# Patient Record
(demographics unavailable — no encounter records)

---

## 2025-02-27 NOTE — SOCIAL HISTORY
Indication for Procedure: Patient is here for radiofrequency ablation of  the right  Suprapatellar, superior lateral and medial and inferior medial genicular nerves of knee because of chronic knee pain unresponsive to previous conservative therapy with medications and physical therapy.     Preoperative Diagnosis: bilateral knee osteoarthritis and chronic bilateral knee pain     Postoperative Diagnosis: Same      Procedure: radiofrequency ablation of right knee- suprapatellar nerve of genicular nerve, superolateral nerve of genicular nerve, superomedial nerve of genicular nerve and inferomedial nerve of genicular nerve injection under fluoroscopic guidance CPT 46343, 89927-78, 61256-08, 6460-59     Anesthesia: intravenous moderate conscious sedation  CPT 82545    Sedation time: 15 minutes     Complications: None     Blood Loss: None     Procedure Note:     After informed consent was obtained from the patient, the patient was then placed in the supine position.      Correct site of injection was marked before the procedure and time out was performed to ensure the correct site and type of injection and correct patient with 2 identifiers.    Moderate sedation was administered with continuous monitoring of the patient by a trained caregiver under my direct supervision. Please refer to nursing documentation for doses of medications administered intravenously as well as the patient’s status during the procedure.      Sterile prep with chloroprep solution, 1% lidocaine was used for skin infiltration.    Four 17-G radiofrequency needles # 40 mm needles were positioned to target   the suprapatellar nerve, superolateral nerve and superomedial nerve and inferomedial nerves of the  genicular nerves around the right   knee joint under fluoroscopic guidance.     The stylets were removed and the radiofrequency probes with a 4mm active tip were then inserted. Needle tip position of the probes were verified in the AP, oblique, and  lateral views. At each site, the medial branch nerve was stimulated at 2Hz to a maximum of 1-2volts determined to finalize safe needle and electrode placement. The patient was awake and responsive during this portion of the procedure. Each target was anesthetized with 1-2mL of 0.25% bupivacaine and 10 mg triamcinolone anesthesia for lesioning and then each target was lesioned at 80 degrees Celsius for 2 minutes and 30 seconds. Tissue impedences were noted to be between 250 and 500 Ohms.    The needles were removed at the end of the procedure and sterile dressing placed.      Patient tolerated the procedure well without any adverse reactions, discharge instructions given to the patient and will be followed up for repeat injection if necessary.     Discharge instructions were given which included application of ice pack over injection site and to continue taking any anti-inflammatory or pain medications. Activity limitations include avoiding strenuous bending, twisting of hips.     Landry Mi MD  Interventional Pain Management  Board certified and fellowship trained               [Parent(s)] : parent(s) [None] : none [Smokers in Household] : there are no smokers in the home

## 2025-02-27 NOTE — HISTORY OF PRESENT ILLNESS
[FreeTextEntry1] : 4yo with Achondroplasia  NPS-[facility] (5/23/21):  oAHI: 1.5/hr, SHIELA 2.1/hr  lowest sat: 89%  average saturation: 99%  highest TCO2: 45 mm Hg  [facility] (3/5/22):  oAHI: 5.8/hr REM 14.4/hr  lowest sat: 79%  average saturation: 93.4%, TCO2:  58 mm Hg  [facility]  oAHI:  13.2 REM 66.1   lowest sat: 82%  average saturation: 96%  highest TCO2:  63 mm Hg  c/w  Adenoidectomy 3/23 T&Gil 3/24 NPS-[place] (6/21/24): oAHI: 43.6 lowest sat:73 % average saturation: 97% highest ETCO2: 55 mm Hg (18% > 50) ,   on O2 pending CPAP  Mask Fitting 10/20/24 Resp Wisp SCM nasal mask (may need larger headgear) Titration-[facility] (10/27/24):   Optimal pressure: 9.  oAHI 0, ave sat 98%, lowest 95%, [id number] 50, mild snoring throughout  DME: [employer] Machine: Vent, stopped alarms last week because was going off nonstop for apnea  Settings: CPAP 9 (planned to trial lower settings as "ramp" but now tolerating) Interface: Wisp, one size larger than the giraffe - fits better but occasional mouth leak  Adherence: now tolerating Barriers to use: prior-alarms, pressure Cleaning/care: yes Supplies' needed: no Daytime symptoms when uses: Nighgttime symptoms when uses: better, sats >95% (off oxygen)   9/24 Pulm: No chronic respiratory symptoms including no chest pain, cough, SOB, exercise intolerance, wheeze.  Normal recovery with colds.  No neb/inhaler use.  No pneumonia,.  No cough/choking with feeds.  No resp c/o anesthesia.  Sleep: Mult sleep studies, jie in [facility] (5/23/21):  oAHI: 1.5/hr, SHIELA 2.1/hr  lowest sat: 89%  average saturation: 99%  highest TCO2: 45 mm Hg   [facility] (3/5/22):  oAHI: 5.8/hr REM 14.4/hr  lowest sat: 79%  average saturation: 93.4%, TCO2:  58 mm Hg  c/w [facility] (9/14/22):  oAHI:  13.2 REM 66.1   lowest sat: 82%  average saturation: 96%  highest TCO2:  63 mm Hg  c/w HW  Adenoidectomy 3/23 f/u study a, worsening THI, placed on oygen 1L NC at night, tolerates well.   3/24- [facility], tonsillectoy 6/24-f/u [facility] with improved but persistent THI. CPAP recommended, snoring improved. Remains on 1L O2, desats 1-2x/week, briefl, self limited, often gone before they can see the savanah   Well rested in daytime.  no naps.  2021 This is a 8 month old female with Achondroplasia.    Following at [place], primarily by Dr Varinder Davila (genetics).  Planned surgery: n/a  Respiratory complications with prior anesthesia: sedated MRI, some trouble with airway when under anesthesia, was unable to get flexion views  (only extension) but per mother enough info to think decompression not needed at this time.    Resp hx: n/a ; Colds: n/a   Baseline symptoms:  no chronic cough  Secretions:  no issues   Airway clearance: none  Diet:  MPA-no coughing   Sleep:  9:30-10pm, 9:30am, 2 naps a day, crib-nothing in it, usually feeds to sleep.  Worsening snoring, rare (maybe twice) noticeable pauses.  No cyanosis.  Prior PSG at [place] (5/23/21):  oAHI: 1.5/hr, SHIELA 2.1/hr  lowest sat: 89%  average saturation: 99%  highest TCO2: 45 mm Hg.    ENT: has not seen as outpatient, but scoped bedside in nursery   fhx: no asthma, no CF, no THI  Services: PT for torticollis

## 2025-02-27 NOTE — CONSULT LETTER
[Dear  ___] : Dear  [unfilled], [Consult Letter:] : I had the pleasure of evaluating your patient, [unfilled]. [Please see my note below.] : Please see my note below. [Consult Closing:] : Thank you very much for allowing me to participate in the care of this patient.  If you have any questions, please do not hesitate to contact me. [Sincerely,] : Sincerely, [DrRadha  ___] : Dr. INGRAM [FreeTextEntry2] : Christa Trinidad MD [FreeTextEntry3] : Mallory Riojas MD\par  Director, [facility]- Pediatric Pulmonology\par  [facility] or [place]\par  , Department of Pediatrics, [facility] [employer]\par

## 2025-02-27 NOTE — REVIEW OF SYSTEMS
[NI] : Genitourinary  [Nl] : Endocrine [Snoring] : snoring [Developmental Delay] : developmental delay [Frequent URIs] : no frequent upper respiratory infections [Daytime Sleepiness] : no daytime sleepiness [Recurrent Ear Infections] : no recurrent ear infections [Recurrent Throat Infections] : no recurrent throat infections [Cough] : no cough [Bronchiolitis] : no bronchiolitis [Pneumonia] : no pneumonia [Problems Swallowing] : no problems swallowing [Muscle Weakness] : no muscle weakness [FreeTextEntry5] : VSD/PDA [FreeTextEntry8] : ventriculomegaly  [de-identified] : MPA on [employer] then [employer]

## 2025-02-27 NOTE — BIRTH HISTORY
[At ___ Weeks Gestation] : at [unfilled] weeks gestation [ Section] : by  section [Age Appropriate] : age appropriate developmental milestones met [FreeTextEntry4] : in [place], failed car seat test

## 2025-02-27 NOTE — PHYSICAL EXAM
[Well Nourished] : well nourished [Well Developed] : well developed [Alert] : ~L alert [Active] : active [Normal Breathing Pattern] : normal breathing pattern [No Respiratory Distress] : no respiratory distress [No Drainage] : no drainage [No Conjunctivitis] : no conjunctivitis [No Nasal Drainage] : no nasal drainage [No Oral Pallor] : no oral pallor [No Oral Cyanosis] : no oral cyanosis [No Stridor] : no stridor [Absence Of Retractions] : absence of retractions [Symmetric] : symmetric [Good Expansion] : good expansion [No Acc Muscle Use] : no accessory muscle use [Good aeration to bases] : good aeration to bases [Equal Breath Sounds] : equal breath sounds bilaterally [No Crackles] : no crackles [No Rhonchi] : no rhonchi [No Wheezing] : no wheezing [Normal Sinus Rhythm] : normal sinus rhythm [No Heart Murmur] : no heart murmur [Soft, Non-Tender] : soft, non-tender [Non Distended] : was not ~L distended [Full ROM] : full range of motion [No Clubbing] : no clubbing [Capillary Refill < 2 secs] : capillary refill less than two seconds [No Cyanosis] : no cyanosis [No Petechiae] : no petechiae [No Contractures] : no contractures [Alert and  Oriented] : alert and oriented [FreeTextEntry3] : external normal  [FreeTextEntry4] : flattened nasal bridge, frontal protuberance  [de-identified] : no visible rashes on exposed skin

## 2025-02-27 NOTE — DATA REVIEWED
[FreeTextEntry1] : NPSG-[facility] (5/23/21):  oAHI: 1.5/hr, SHIELA 2.1/hr  lowest sat: 89%  average saturation: 99%  highest TCO2: 45 mm Hg   NPSG-[facility] (3/5/22):  oAHI: 5.8/hr REM 14.4/hr  lowest sat: 79%  average saturation: 93.4%, TCO2:  58 mm Hg  c/w HW   NPSG -[facility] (9/14/22):  oAHI:  13.2 REM 66.1   lowest sat: 82%  average saturation: 96%  highest TCO2:  63 mm Hg  c/w HW

## 2025-02-27 NOTE — HISTORY OF PRESENT ILLNESS
[FreeTextEntry1] : 2yo with Achondroplasia  NPS-[facility] (5/23/21):  oAHI: 1.5/hr, SHIELA 2.1/hr  lowest sat: 89%  average saturation: 99%  highest TCO2: 45 mm Hg  [facility] [place]/22):  oAHI: 5.8/hr REM 14.4/hr  lowest sat: 79%  average saturation: 93.4%, TCO2:  58 mm Hg  [facility]  oAHI:  13.2 REM 66.1   lowest sat: 82%  average saturation: 96%  highest TCO2:  63 mm Hg  c/w  Adenoidectomy 3/23 T&Gil 3/24 NPS-[place] (6/21/24): oAHI: 43.6 lowest sat:73 % average saturation: 97% highest ETCO2: 55 mm Hg (18% > 50) ,   on O2 pending CPAP  Mask Fitting 10/20/24 Resp Wisp SCM nasal mask (may need larger headgear) [facility] (10/27/24):   Optimal pressure: 9.  oAHI 0, ave sat 98%, lowest 95%, [id number] 50, mild snoring throughout  DME: [employer] Machine: Vent, stopped alarms last week because was going off nonstop for apnea  Settings: CPAP 9 (planned to trial lower settings as "ramp" but now tolerating) Interface: Wisp, one size larger than the giraffe - fits better but occasional mouth leak  Adherence: now tolerating Barriers to use: prior-alarms, pressure Cleaning/care: yes Supplies' needed: no Daytime symptoms when uses: Nighgttime symptoms when uses: better, sats >95% (off oxygen)   9/24 Pulm: No chronic respiratory symptoms including no chest pain, cough, SOB, exercise intolerance, wheeze.  Normal recovery with colds.  No neb/inhaler use.  No pneumonia,.  No cough/choking with feeds.  No resp c/o anesthesia.  Sleep: Mult sleep studies, jie in [facility] (5/23/21):  oAHI: 1.5/hr, SHIELA 2.1/hr  lowest sat: 89%  average saturation: 99%  highest TCO2: 45 mm Hg   [facility] (3/5/22):  oAHI: 5.8/hr REM 14.4/hr  lowest sat: 79%  average saturation: 93.4%, TCO2:  58 mm Hg  c/w [facility] (9/14/22):  oAHI:  13.2 REM 66.1   lowest sat: 82%  average saturation: 96%  highest TCO2:  63 mm Hg  c/w HW  Adenoidectomy 3/23 f/u study a, worsening THI, placed on oygen 1L NC at night, tolerates well.   3/24- [facility], tonsillectoy 6/24-f/u [facility] with improved but persistent THI. CPAP recommended, snoring improved. Remains on 1L O2, desats 1-2x/week, briefl, self limited, often gone before they can see the savanah   Well rested in daytime.  no naps.  2021 This is a 8 month old female with Achondroplasia.    Following at [place], primarily by Dr Varinder Davila (genetics).  Planned surgery: n/a  Respiratory complications with prior anesthesia: sedated MRI, some trouble with airway when under anesthesia, was unable to get flexion views  (only extension) but per mother enough info to think decompression not needed at this time.    Resp hx: n/a ; Colds: n/a   Baseline symptoms:  no chronic cough  Secretions:  no issues   Airway clearance: none  Diet:  MPA-no coughing   Sleep:  9:30-10pm, 9:30am, 2 naps a day, crib-nothing in it, usually feeds to sleep.  Worsening snoring, rare (maybe twice) noticeable pauses.  No cyanosis.  Prior PSG at [place] (5/23/21):  oAHI: 1.5/hr, SHIELA 2.1/hr  lowest sat: 89%  average saturation: 99%  highest TCO2: 45 mm Hg.    ENT: has not seen as outpatient, but scoped bedside in nursery   fhx: no asthma, no CF, no THI  Services: PT for torticollis

## 2025-02-27 NOTE — CONSULT LETTER
[Dear  ___] : Dear  [unfilled], [Consult Letter:] : I had the pleasure of evaluating your patient, [unfilled]. [Please see my note below.] : Please see my note below. [Consult Closing:] : Thank you very much for allowing me to participate in the care of this patient.  If you have any questions, please do not hesitate to contact me. [Sincerely,] : Sincerely, [DrRadha  ___] : Dr. INGRAM [FreeTextEntry2] : Christa Trinidad MD [FreeTextEntry3] : Mallory Riojas MD\par  Director, Pediatric Sleep Disorders Center- Pediatric Pulmonology\par  [facility] or [place]\par  , Department of Pediatrics, [facility] [employer]\par

## 2025-02-27 NOTE — REVIEW OF SYSTEMS
[NI] : Genitourinary  [Nl] : Endocrine [Snoring] : snoring [Developmental Delay] : developmental delay [Frequent URIs] : no frequent upper respiratory infections [Daytime Sleepiness] : no daytime sleepiness [Recurrent Ear Infections] : no recurrent ear infections [Recurrent Throat Infections] : no recurrent throat infections [Cough] : no cough [Bronchiolitis] : no bronchiolitis [Pneumonia] : no pneumonia [Problems Swallowing] : no problems swallowing [Muscle Weakness] : no muscle weakness [FreeTextEntry5] : VSD/PDA [FreeTextEntry8] : ventriculomegaly  [de-identified] : MPA on [employer] then [employer]

## 2025-03-05 NOTE — CONSULT LETTER
[Today's Date] : [unfilled] [Name] : Name: [unfilled] [] : : ~~ [Today's Date:] : [unfilled] [Dear  ___:] : Dear Dr. [unfilled]: [Consult] : I had the pleasure of evaluating your patient, [unfilled]. My full evaluation follows. [Consult - Single Provider] : Thank you very much for allowing me to participate in the care of this patient. If you have any questions, please do not hesitate to contact me. [Sincerely,] : Sincerely, [FreeTextEntry4] : Christa Trinidad MD [FreeTextEntry5] : [address] [facility] [address], [place] [address] [FreeTextEntry6] : [phone]

## 2025-03-05 NOTE — CARDIOLOGY SUMMARY
[Today's Date] : [unfilled] [FreeTextEntry1] : Normal sinus rhythm with a rate of 125, normal QRS axis, normal intervals, QTc 424.  No evidence of atrial or ventricular enlargement.  Normal T waves and ST segments.  No delta waves.

## 2025-03-05 NOTE — HISTORY OF PRESENT ILLNESS
[FreeTextEntry1] : I had the pleasure of seeing JAMISON ZAVALA in the pediatric cardiology clinic at [facility] on 2023; she was last seen in clinic on 2022.  JAMISON is a 2-year-old girl with achondroplasia here for follow-up of a trivial muscular VSD.  She previously was followed for a PDA, which was closed at the last visit.  Her mother had a fetal echocardiogram that was concerning for a small transverse aortic arch and isthmus -  echocardiogram showed a normal arch, small-mod PDA, PFO and trivial muscular VSD.  In utero and  concerns for brain ventriculomegaly, as well as skeletal dysplasia.  Genetic testing recently confirmed skeletal dysplasia.  Since her last appointment, Jamison has been doing well - feeding well and gaining weight appropriately.  She has been diagnosed with obstructive sleep apnea, and over the past year she has been started on NC O2 overnight.  In 2023 she had her adenoids removed.  Her father feels that she has been sleeping better, and daytime activities, along with developmental milestones, have improved.  No evidence of increased work of breathing, chest discomfort or dizziness.  Normal stools and wet diapers.  Normal activity levels.   Jamison has been evaluated by the Skeletal Dysplasia Program in [facility]     UD: CPAP at night for THI

## 2025-03-05 NOTE — DISCUSSION/SUMMARY
[FreeTextEntry1] : Snehal is a 2-year-old girl with skeletal dysplasia who is followed by me for a trivial muscular VSD and THI.  Previously visualized PFO and PDA were closed on prior echocardiograms.    By echocardiogram Snehal continues to have a very trivial apical muscular VSD.  However, I do NOT hear the murmur from this on exam.  I expect this VSD to close, and I do not anticipate that it will cause her any symptoms.    Snehal also has moderate THI.  I discussed with her parents (dad in person, mom over the phone) that THI does have some impact on the heart, so I would recommend following for that even if VSD is closed.  Recommendations: - F/U 1 year, echo at that time to assess vsd and pulm HTN - will continue to follow even after vsd closes given THI and risk of pulm HTN [Needs SBE Prophylaxis] : [unfilled] does not need bacterial endocarditis prophylaxis

## 2025-03-05 NOTE — PHYSICAL EXAM
[General Appearance - Alert] : alert [General Appearance - In No Acute Distress] : in no acute distress [General Appearance - Well Nourished] : well nourished [General Appearance - Well Developed] : well developed [General Appearance - Well-Appearing] : well appearing [Facies] : the head and face were normal in appearance [Sclera] : the conjunctiva were normal [Outer Ear] : the ears and nose were normal in appearance [Examination Of The Oral Cavity] : mucous membranes were moist and pink [Auscultation Breath Sounds / Voice Sounds] : breath sounds clear to auscultation bilaterally [Normal Chest Appearance] : the chest was normal in appearance [Apical Impulse] : quiet precordium with normal apical impulse [Heart Rate And Rhythm] : normal heart rate and rhythm [Heart Sounds] : normal S1 and S2 [Heart Sounds Gallop] : no gallops [Heart Sounds Pericardial Friction Rub] : no pericardial rub [Edema] : no edema [Arterial Pulses] : normal upper and lower extremity pulses with no pulse delay [Heart Sounds Click] : no clicks [Capillary Refill Test] : normal capillary refill [Bowel Sounds] : normal bowel sounds [Abdomen Soft] : soft [Nondistended] : nondistended [Abdomen Tenderness] : non-tender [Nail Clubbing] : no clubbing  or cyanosis of the fingers [Motor Tone] : normal muscle strength and tone [] : no rash [Skin Lesions] : no lesions [Skin Turgor] : normal turgor [Demonstrated Behavior - Infant Nonreactive To Parents] : interactive [Mood] : mood and affect were appropriate for age [Demonstrated Behavior] : normal behavior [FreeTextEntry1] : Short stature [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